# Patient Record
Sex: FEMALE | URBAN - METROPOLITAN AREA
[De-identification: names, ages, dates, MRNs, and addresses within clinical notes are randomized per-mention and may not be internally consistent; named-entity substitution may affect disease eponyms.]

---

## 2023-06-07 ENCOUNTER — OFFICE VISIT (OUTPATIENT)
Facility: LOCATION | Age: 38
End: 2023-06-07
Payer: COMMERCIAL

## 2023-06-07 DIAGNOSIS — L98.0 PYOGENIC GRANULOMA: Primary | ICD-10-CM

## 2023-06-07 PROCEDURE — 92004 COMPRE OPH EXAM NEW PT 1/>: CPT | Performed by: OPTOMETRIST

## 2023-06-07 ASSESSMENT — VISUAL ACUITY
OD: 20/20
OS: 20/20

## 2023-06-07 ASSESSMENT — INTRAOCULAR PRESSURE
OS: 15
OD: 15

## 2023-06-07 NOTE — IMPRESSION/PLAN
Impression: Pyogenic granuloma: L98.0. Plan: Findings from today's examination are consistent with a pyogenic granuloma RLL. Discussed findings in detail with patient. Patient reports previously being prescribed Erythomycin by PCP. No additional medication is recommended at this time. Discussed with patient that the condition may require surgical excision. Will refer patient to occuloplastics for further evaluation and treatment. RTC: Next available lid evaluation with Dr. Flex Jacobo.

## 2023-06-21 ENCOUNTER — OFFICE VISIT (OUTPATIENT)
Dept: URBAN - METROPOLITAN AREA CLINIC 52 | Facility: CLINIC | Age: 38
End: 2023-06-21
Payer: COMMERCIAL

## 2023-06-21 DIAGNOSIS — H11.031 DOUBLE PTERYGIUM OF RIGHT EYE: ICD-10-CM

## 2023-06-21 DIAGNOSIS — H00.12 CHALAZION RIGHT LOWER LID: Primary | ICD-10-CM

## 2023-06-21 PROCEDURE — 92004 COMPRE OPH EXAM NEW PT 1/>: CPT | Performed by: OPHTHALMOLOGY

## 2023-06-21 NOTE — IMPRESSION/PLAN
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 Plan: Discussed diagnosis in detail with patient. Advised patient of condition. Will continue to observe condition and or symptoms.

## 2023-06-21 NOTE — IMPRESSION/PLAN
Impression: Chalazion right lower lid: H00.12. Plan: Patient presents with chalazion w/ small granulomia RLL Patient agrees to have office procedure done. Discussed diagnosis in detail with patient. Discussed treatment options with patient. Advised patient of condition. In office procedure required. Risks and benefits were discussed, explained and understood by patient. RTC Office Procedure 6-8 weeks
code [id number] Advise patient to STOP all OTC blood thinners 10-14 days prior to Surgery

## 2023-07-31 ENCOUNTER — OFFICE VISIT (OUTPATIENT)
Dept: URBAN - METROPOLITAN AREA CLINIC 52 | Facility: CLINIC | Age: 38
End: 2023-07-31
Payer: COMMERCIAL

## 2023-07-31 DIAGNOSIS — H00.12 CHALAZION RIGHT LOWER LID: Primary | ICD-10-CM

## 2023-07-31 PROCEDURE — 67800 REMOVE EYELID LESION: CPT | Performed by: OPHTHALMOLOGY

## 2023-07-31 PROCEDURE — 92012 INTRM OPH EXAM EST PATIENT: CPT | Performed by: OPHTHALMOLOGY

## 2023-07-31 RX ORDER — TOBRAMYCIN AND DEXAMETHASONE 3; 1 MG/ML; MG/ML
SUSPENSION/ DROPS OPHTHALMIC
Qty: 10 | Refills: 0 | Status: ACTIVE
Start: 2023-07-31